# Patient Record
Sex: FEMALE | Race: WHITE | NOT HISPANIC OR LATINO | URBAN - NONMETROPOLITAN AREA
[De-identification: names, ages, dates, MRNs, and addresses within clinical notes are randomized per-mention and may not be internally consistent; named-entity substitution may affect disease eponyms.]

---

## 2022-04-26 DIAGNOSIS — N93.9 ABNORMAL UTERINE AND VAGINAL BLEEDING, UNSPECIFIED: ICD-10-CM

## 2022-04-26 DIAGNOSIS — Z01.818 ENCOUNTER FOR OTHER PREPROCEDURAL EXAMINATION: ICD-10-CM

## 2022-04-26 DIAGNOSIS — N84.0 POLYP OF CORPUS UTERI: ICD-10-CM

## 2023-06-28 ENCOUNTER — HOSPITAL ENCOUNTER (OUTPATIENT)
Dept: ULTRASOUND IMAGING | Facility: HOSPITAL | Age: 48
Discharge: HOME | End: 2023-06-28
Attending: OBSTETRICS & GYNECOLOGY
Payer: COMMERCIAL

## 2023-06-28 DIAGNOSIS — N83.201 UNSPECIFIED OVARIAN CYST, RIGHT SIDE: ICD-10-CM

## 2023-06-28 PROCEDURE — 76830 TRANSVAGINAL US NON-OB: CPT

## 2023-06-28 PROCEDURE — 76856 US EXAM PELVIC COMPLETE: CPT

## 2023-07-20 ENCOUNTER — ANESTHESIA EVENT (OUTPATIENT)
Dept: PERIOP | Facility: HOSPITAL | Age: 48
End: 2023-07-20
Payer: COMMERCIAL

## 2023-07-21 RX ORDER — METOPROLOL SUCCINATE 25 MG/1
12.5 TABLET, EXTENDED RELEASE ORAL
COMMUNITY

## 2023-07-21 RX ORDER — PANTOPRAZOLE SODIUM 40 MG/1
40 TABLET, DELAYED RELEASE ORAL 2 TIMES DAILY
COMMUNITY

## 2023-07-21 RX ORDER — SUMATRIPTAN 50 MG/1
50 TABLET, FILM COATED ORAL ONCE AS NEEDED
COMMUNITY

## 2023-07-21 RX ORDER — FLUTICASONE PROPIONATE 50 MCG
2 SPRAY, SUSPENSION (ML) NASAL AS NEEDED
COMMUNITY

## 2023-07-21 RX ORDER — LANOLIN ALCOHOL/MO/W.PET/CERES
CREAM (GRAM) TOPICAL DAILY
COMMUNITY

## 2023-07-21 RX ORDER — FAMOTIDINE 20 MG/1
20 TABLET, FILM COATED ORAL 2 TIMES DAILY
COMMUNITY

## 2023-07-21 NOTE — PRE-PROCEDURE INSTRUCTIONS
Pre-Surgery Instructions:   Medication Instructions   • famotidine (PEPCID) 20 mg tablet Take day of surgery. • fluticasone (FLONASE) 50 mcg/act nasal spray Uses PRN- OK to take day of surgery   • metoprolol succinate (TOPROL-XL) 25 mg 24 hr tablet Take night before surgery   • Multiple Vitamin (MULTIVITAMIN) capsule Stop taking 7 days prior to surgery. • norgestimate-ethinyl estradiol (ORTHO-CYCLEN) 0.25-35 MG-MCG per tablet Take day of surgery. • pantoprazole (PROTONIX) 40 mg tablet Take day of surgery. • SUMAtriptan (IMITREX) 50 mg tablet Uses PRN- OK to take day of surgery   • vitamin B-12 (VITAMIN B-12) 1,000 mcg tablet Stop taking 7 days prior to surgery. See above    . Medication instructions for day surgery reviewed. Please use only a sip of water to take your instructed medications. Avoid all over the counter vitamins, supplements and NSAIDS for one week prior to surgery per anesthesia guidelines. Tylenol is ok to take as needed. You will receive a call one business day prior to surgery with an arrival time and hospital directions. If your surgery is scheduled on a Monday, the hospital will be calling you on the Friday prior to your surgery. If you have not heard from anyone by 8pm, please call the hospital supervisor through the hospital  at [phone]. Romina Lakeshia [phone]). Do not eat or drink anything after midnight the night before your surgery, including candy, mints, lifesavers, or chewing gum. Do not drink alcohol 24hrs before your surgery. Try not to smoke at least 24hrs before your surgery. Follow the pre surgery showering instructions as listed in the [facility] Surgical Experience Booklet” or otherwise provided by your surgeon's office. Do not shave the surgical area 24 hours before surgery. Do not apply any lotions, creams, including makeup, cologne, deodorant, or perfumes after showering on the day of your surgery.      No contact lenses, eye make-up, or artificial eyelashes. Remove nail polish, including gel polish, and any artificial, gel, or acrylic nails if possible. Remove all jewelry including rings and body piercing jewelry. Wear causal clothing that is easy to take on and off. Consider your type of surgery. Keep any valuables, jewelry, piercings at home. Please bring any specially ordered equipment (sling, braces) if indicated. Arrange for a responsible person to drive you to and from the hospital on the day of your surgery. Visitor Guidelines discussed. Call the surgeon's office with any new illnesses, exposures, or additional questions prior to surgery. Please reference your [facility] Surgical Experience Booklet” for additional information to prepare for your upcoming surgery.

## 2023-08-02 ENCOUNTER — ANESTHESIA (OUTPATIENT)
Dept: PERIOP | Facility: HOSPITAL | Age: 48
End: 2023-08-02
Payer: COMMERCIAL

## 2023-09-05 NOTE — ANESTHESIA PREPROCEDURE EVALUATION
Procedure:  DIAGNOSTIC LAPAROSCOPY, ROBOTIC ASSISTED LAPAROSCOPIC BILATERAL SALPINGO-OOPHERECTOMY, ANY OTHER INDICATED PROCEDURE (Abdomen)    Relevant Problems   No relevant active problems      PONV    SVT    ANX/DEP  Physical Exam    Airway    Mallampati score: II  TM Distance: >3 FB  Neck ROM: full     Dental   No notable dental hx     Cardiovascular  Cardiovascular exam normal    Pulmonary  Pulmonary exam normal     Other Findings        Anesthesia Plan  ASA Score- 2     Anesthesia Type- general with ASA Monitors. Additional Monitors:   Airway Plan: ETT. Plan Factors-Exercise tolerance (METS): >4 METS. Chart reviewed. EKG reviewed. Imaging results reviewed. Existing labs reviewed. Patient summary reviewed. Patient is not a current smoker. Patient not instructed to abstain from smoking on day of procedure. Patient did not smoke on day of surgery. Obstructive sleep apnea risk education given perioperatively. Induction- intravenous. Postoperative Plan-     Informed Consent- Anesthetic plan and risks discussed with patient. I personally reviewed this patient with the CRNA. Discussed and agreed on the Anesthesia Plan with the CRNA. Kip Finch

## 2023-09-05 NOTE — PRE-PROCEDURE INSTRUCTIONS
Pre-Surgery Instructions:   Medication Instructions   • famotidine (PEPCID) 20 mg tablet Take day of surgery. • fluticasone (FLONASE) 50 mcg/act nasal spray Uses PRN- OK to take day of surgery   • metoprolol succinate (TOPROL-XL) 25 mg 24 hr tablet Take night before surgery   • Multiple Vitamin (MULTIVITAMIN) capsule Stop taking 7 days prior to surgery. • norgestimate-ethinyl estradiol (ORTHO-CYCLEN) 0.25-35 MG-MCG per tablet Take day of surgery. • pantoprazole (PROTONIX) 40 mg tablet Take day of surgery. • SUMAtriptan (IMITREX) 50 mg tablet Uses PRN- OK to take day of surgery   • vitamin B-12 (VITAMIN B-12) 1,000 mcg tablet Stop taking 7 days prior to surgery. See above    . Medication instructions for day surgery reviewed. Please use only a sip of water to take your instructed medications. Avoid all over the counter vitamins, supplements and NSAIDS for one week prior to surgery per anesthesia guidelines. Tylenol is ok to take as needed. You will receive a call one business day prior to surgery with an arrival time and hospital directions. If your surgery is scheduled on a Monday, the hospital will be calling you on the Friday prior to your surgery. If you have not heard from anyone by 8pm, please call the hospital supervisor through the hospital  at [phone]. Naeem Cool [phone]). Do not eat or drink anything after midnight the night before your surgery, including candy, mints, lifesavers, or chewing gum. Do not drink alcohol 24hrs before your surgery. Try not to smoke at least 24hrs before your surgery. Follow the pre surgery showering instructions as listed in the [facility] Surgical Experience Booklet” or otherwise provided by your surgeon's office. Do not shave the surgical area 24 hours before surgery. Do not apply any lotions, creams, including makeup, cologne, deodorant, or perfumes after showering on the day of your surgery.      No contact lenses, eye make-up, or artificial eyelashes. Remove nail polish, including gel polish, and any artificial, gel, or acrylic nails if possible. Remove all jewelry including rings and body piercing jewelry. Wear causal clothing that is easy to take on and off. Consider your type of surgery. Keep any valuables, jewelry, piercings at home. Please bring any specially ordered equipment (sling, braces) if indicated. Arrange for a responsible person to drive you to and from the hospital on the day of your surgery. Visitor Guidelines discussed. Call the surgeon's office with any new illnesses, exposures, or additional questions prior to surgery. Please reference your [facility] Booklet” for additional information to prepare for your upcoming surgery.

## 2023-09-05 NOTE — H&P
HISTORY AND PHYSICAL    GYN ANNUAL EXAM    Kellie Qureshi is a 56year old female. Chief Complaint   Patient presents with   • Gyn Return     HPI:    HPI    This is a 56year old  presents to the office today for a routine annual exam. Overall patient is doing well with no major issues. Denies fevers, chills, chest pain, shortness of breath, nausea and vomiting. No abdominal or pelvic pain. No abnormal bleeding or vaginal discharge. Patient would also like to talk about management for her persistent ovarian cysts. Despite birth control does get pain on a monthly basis. Would like to consider hysterectomy. OB History: para 0    Gyn History: q 28 days times 3 days. Flow light, Aches (only if on birth control pill) headaches. Pap Smear: 2018    Contraception: OCP    Family history: Multiple relatives with breast cancer. Both grandmothers. One aunt.  Also 1 aunt with ovarian cancer    Mammogram: scheduled for 2023    Colonoscopy:     DEXA scan: never    PMH:    Past Medical History:   Diagnosis Date   • Abnormal uterine bleeding (AUB) 2022   • Carpal tunnel syndrome   • Cervical polyp 2022   • COVID-19 virus infection 2023   • Depression   • Elevated LFTs 10/19/2020   • Hyperlipidemia   • Lactose intolerance   • Migraine   • Paroxysmal atrial tachycardia (HCC) 3/19/2019   • Peroneal tenosynovitis   • PONV (postoperative nausea and vomiting)     Past Surgical History:   Procedure Laterality Date   • ARTHROCENT ASP &/OR INJ MAJOR JX/BURSA W/O US 4/15/2014   ARTHROCENTESIS OR INJECTION MAJOR JOINT performed by Lu Emanuel MD at [address] [place] [address] 4/15/2014   NEUROPLASTY MEDIAN NERVE AT CARPAL TUNNEL performed by Lu Emanuel MD at [facility]   • COLONOSCOPY, DIAGNOSTIC (RECTUM) N/A 2022   COLONOSCOPY FLEXIBLE PROXIMAL DIAGNOSTIC performed by Byron Estes DO at [address] [place] [address]   • EGD, FLEXIBLE, DIAGNOSTIC N/A 2018 ESOPHAGOGASTRODUODENOSCOPY (EGD), FLEXIBLE, TRANSORAL, DIAGNOSTIC performed by Ronald Helm DO at [address] [place] [address]   • EGD, FLEXIBLE, DIAGNOSTIC N/A 9/20/2021   ESOPHAGOGASTRODUODENOSCOPY (EGD), FLEXIBLE, TRANSORAL, DIAGNOSTIC performed by Fransisco Estevez MD at [address] [place] [address]   • HYSTEROSCOPY W/BIOPSY AND/OR POLYPECTOMY W/WO D&C 8/2/2022   HYSTEROSCOPY WITH BIOPSY AND/OR POLYPECTOMY WITH OR WITHOUT D&C performed by Marquise Raya MD at [address]   • KNEE ARTHROSCOPY/REPAIR LIGAMENT Left 9/28/2020   ARTHROSCOPICALLY AIDED ACL RECONSTRUCTION performed by Jerri Villalpando MD at [facility]   • KNEE ARTHROSCOPY/SYNOVECTOMY, MAJOR 9/28/2020   ARTHROSCOPY KNEE SYNOVECTOMY 2 OR MORE COMPARTMENTS performed by Jerri Villalpando MD at [address] MEDIAL/MEDIAL RECONSTRUCTION Left 9/28/2020   KNEE POSTERA MEDIAL/MEDIAL RECONSTRUCTION performed by Jerri Villalpando MD at [address] [facility]; CHOLECYSTECTOMY N/A 1/2/2019   LAPAROSCOPIC CHOLECYSTECTOMY performed by Pola Mccloud MD at [address] [place] [address] Blvd     Family History   Problem Relation Age of Onset   • Diabetes Mother   • Other (High cholesterol) Mother   • Hypertension Father   • Heart attack Father 58   S/p CABG x4   • Cancer Grandmother (Maternal)   Breast   • Breast Cancer Grandmother (Maternal)   Mid 46s   • Diabetes Grandmother (Paternal)   • Breast Cancer Grandmother (Paternal)   • Heart attack Grandfather (Paternal)   • Colon cancer Grandfather (Paternal) 68   • Hypertension Uncle (Unspecified)   • Diabetes Aunt (Maternal)   • Ovarian cancer Aunt (Maternal)   • Breast Cancer Aunt (Maternal)   • Diabetes Aunt (Paternal)   3 paternal aunts   • Heart attack Uncle (Paternal)   3 paternal uncles   • Cancer Other   Breast   • No Known Problems Other   • Breast Cancer Cousin (Maternal)   • Breast Cancer Cousin (Paternal)     Current Outpatient Medications   Medication Sig Dispense Refill   • Multiple Vitamin (MULTIVITAMINS) Capsule Take 1 Capsule by mouth in the morning. • Riboflavin 400 MG Tablet Take 1 Tab by mouth daily. 90 Tab 1   • Pantoprazole Sodium 40 MG Oral Tablet Delayed Release (Protonix) take 1 tablet by mouth twice a day 180 Tablet 2   • Famotidine 20 MG Oral Tablet (Pepcid) take 1 tablet by mouth twice a day 180 Tablet 0   • Fluticasone Propionate 50 MCG/ACT Nasal Suspension (Flonase) Administer 2 Sprays into each nostril in the morning. 9.9 mL 1   • Metoprolol Succinate ER 25 MG Oral Tablet Extended Release 24 Hour (toPROL XL) Take 0.5 Tablets by mouth in the morning. 45 Tablet 3   • SUMAtriptan Succinate 50 MG Oral Tablet (Imitrex) Take 1 tablets at onset of migraine and one tablet every 2 hours as needed, not more than 4 tablets in 24 hours 8 Tablet 11   • Levonorgestrel-Ethinyl Estrad 0.15-30 MG-MCG Oral Tablet (Isela-28) Take 1 Tablet by mouth in the morning. 84 Tablet 4     No current facility-administered medications for this visit. Review of patient's allergies indicates: Allergen Reactions   • Strawberry Extract Edema airway   Eyes swell, chest rash, throat closing   • Effexor [Venlafaxine] Other (Please comment)   Shashun lugo   • Ketek [Telithromycin]   Chest pain   • Lactose   • Raspberry Allergy test positive   • Rhubarb Itching and Rash   • Topamax [Topiramate] Other (Please comment)   swelling     ROS:  Review of Systems   Constitutional: Negative for activity change, appetite change, fatigue and fever. HENT: Negative for congestion, facial swelling, hearing loss, sinus pressure and sinus pain. Eyes: Negative for discharge and itching. Respiratory: Negative for apnea and chest tightness. Cardiovascular: Negative for chest pain and leg swelling. Gastrointestinal: Negative for abdominal distention, abdominal pain and nausea. Endocrine: Negative for cold intolerance and heat intolerance. Genitourinary: Negative for difficulty urinating, dyspareunia, vaginal bleeding, vaginal discharge and vaginal pain.    Musculoskeletal: Negative for arthralgias, back pain and gait problem. Skin: Negative for color change and pallor. Allergic/Immunologic: Negative for environmental allergies. Neurological: Negative for dizziness, facial asymmetry and numbness. Psychiatric/Behavioral: Negative for agitation, behavioral problems and suicidal ideas. Objective   /66  Pulse 81  Temp 36.5 °C (97.7 °F)  Resp 20  Ht 1.651 m (5' 5")  Wt 114.3 kg (252 lb)  LMP 05/28/2023 (Exact Date)  SpO2 97%  BMI 41.93 kg/m²  BSA 2.29 m²     Physical Exam:  Physical Exam  Constitutional:   General: She is not in acute distress. Appearance: She is not ill-appearing or toxic-appearing. HENT:   Head: Normocephalic and atraumatic. Nose: Nose normal.   Mouth/Throat:   Mouth: Mucous membranes are moist.     Eyes:   Conjunctiva/sclera: Conjunctivae normal.   Pupils: Pupils are equal, round, and reactive to light. Cardiovascular:   Rate and Rhythm: Normal rate and regular rhythm. Breast Exam  Right breast: No lumps, lesions or masses palpated. No swollen lymph nodes. No nipple discharge. Left breast: No lumps, lesions or masses palpated. No swollen lymph nodes. No nipple discharge. Breasts are symmetrical in shape    Pulmonary:   Effort: No respiratory distress. Breath sounds: Normal breath sounds. Abdominal:   Palpations: Abdomen is soft. Tenderness: There is no abdominal tenderness. There is no guarding. Genitourinary:  General: Normal vulva. Vagina: No vaginal discharge. External exam: No rashes, lumps or lesions noted. Internal exam: No cervical motion tenderness. Anteverted uterus. Negative for adnexal tenderness. No adnexal masses palpated. No vaginal bleeding noted. No abnormal discharge noted    Musculoskeletal:   General: No swelling or tenderness. Neurological:   Mental Status: She is alert and oriented to person, place, and time.      Psychiatric:   Mood and Affect: Mood normal.   Behavior: Behavior normal.         ASSESSMENT/PLAN:    Musa Shultz was seen today for gyn return. Diagnoses and all orders for this visit:    Encounter for gynecological examination without abnormal finding  - GYN PAP SCREEN    Screen for STD (sexually transmitted disease)    Screening breast examination    Birth control counseling    Family history of breast cancer    Right ovarian cyst  - US PELVIS TRANS-VAGINAL NON-OB    Cyst of right ovary  -   - CEA    Pre-op testing  - CBC WITH WBC DIFFERENTIAL    Other orders  - Levonorgestrel-Ethinyl Estrad 0.15-30 MG-MCG Oral Tablet (Isela-28); Take 1 Tablet by mouth in the morning. 7. Persistent ovarian cysts  Does have history of right dermoid cyst. Mostly the pain is on her right side. There is a significant family history of breast and ovarian cancer. Cycles have been relatively normal  -discussed different surgical options. Patient does have several comorbidities. Unfortunately, birth control is not helping her pain. We discussed at every surgery has risks. We discussed more conservative versus aggressive options. We discussed things like unilateral oophorectomy, versus bilateral salpingo-oophorectomy versus hysterectomy. Risks and benefits were discussed. Will opt for a robotic bilateral salpingo-oophorectomy. All questions were answered. Consents were signed.     Patient will be for surgery at [address] [place] [address]  will need medical clearance from PCP

## 2023-09-06 ENCOUNTER — HOSPITAL ENCOUNTER (OUTPATIENT)
Facility: HOSPITAL | Age: 48
Setting detail: OUTPATIENT SURGERY
Discharge: HOME | End: 2023-09-06
Attending: OBSTETRICS & GYNECOLOGY | Admitting: OBSTETRICS & GYNECOLOGY
Payer: COMMERCIAL

## 2023-09-06 VITALS
OXYGEN SATURATION: 97 % | WEIGHT: 253 LBS | TEMPERATURE: 97.9 F | BODY MASS INDEX: 42.15 KG/M2 | HEART RATE: 75 BPM | RESPIRATION RATE: 20 BRPM | DIASTOLIC BLOOD PRESSURE: 76 MMHG | HEIGHT: 65 IN | SYSTOLIC BLOOD PRESSURE: 114 MMHG

## 2023-09-06 DIAGNOSIS — R10.2 PELVIC AND PERINEAL PAIN: ICD-10-CM

## 2023-09-06 DIAGNOSIS — N83.201 UNSPECIFIED OVARIAN CYST, RIGHT SIDE: ICD-10-CM

## 2023-09-06 LAB
ABO GROUP BLD: NORMAL
BLD GP AB SCN SERPL QL: NEGATIVE
EXT PREGNANCY TEST URINE: NEGATIVE
EXT. CONTROL: NORMAL
RH BLD: POSITIVE
SPECIMEN EXPIRATION DATE: NORMAL

## 2023-09-06 PROCEDURE — 86901 BLOOD TYPING SEROLOGIC RH(D): CPT | Performed by: ANESTHESIOLOGY

## 2023-09-06 PROCEDURE — 86850 RBC ANTIBODY SCREEN: CPT | Performed by: ANESTHESIOLOGY

## 2023-09-06 PROCEDURE — 81025 URINE PREGNANCY TEST: CPT | Performed by: OBSTETRICS & GYNECOLOGY

## 2023-09-06 PROCEDURE — 88305 TISSUE EXAM BY PATHOLOGIST: CPT | Performed by: PATHOLOGY

## 2023-09-06 PROCEDURE — 86900 BLOOD TYPING SEROLOGIC ABO: CPT | Performed by: ANESTHESIOLOGY

## 2023-09-06 RX ORDER — ONDANSETRON 2 MG/ML
INJECTION INTRAMUSCULAR; INTRAVENOUS AS NEEDED
Status: DISCONTINUED | OUTPATIENT
Start: 2023-09-06 | End: 2023-09-06

## 2023-09-06 RX ORDER — FENTANYL CITRATE/PF 50 MCG/ML
25 SYRINGE (ML) INJECTION
Status: DISCONTINUED | OUTPATIENT
Start: 2023-09-06 | End: 2023-09-06 | Stop reason: HOSPADM

## 2023-09-06 RX ORDER — SODIUM CHLORIDE, SODIUM LACTATE, POTASSIUM CHLORIDE, CALCIUM CHLORIDE 600; 310; 30; 20 MG/100ML; MG/100ML; MG/100ML; MG/100ML
125 INJECTION, SOLUTION INTRAVENOUS CONTINUOUS
Status: DISCONTINUED | OUTPATIENT
Start: 2023-09-06 | End: 2023-09-06 | Stop reason: HOSPADM

## 2023-09-06 RX ORDER — HYDROMORPHONE HCL/PF 1 MG/ML
SYRINGE (ML) INJECTION AS NEEDED
Status: DISCONTINUED | OUTPATIENT
Start: 2023-09-06 | End: 2023-09-06

## 2023-09-06 RX ORDER — ONDANSETRON 2 MG/ML
4 INJECTION INTRAMUSCULAR; INTRAVENOUS ONCE AS NEEDED
Status: DISCONTINUED | OUTPATIENT
Start: 2023-09-06 | End: 2023-09-06 | Stop reason: HOSPADM

## 2023-09-06 RX ORDER — SCOLOPAMINE TRANSDERMAL SYSTEM 1 MG/1
1 PATCH, EXTENDED RELEASE TRANSDERMAL
Status: DISCONTINUED | OUTPATIENT
Start: 2023-09-06 | End: 2023-09-06 | Stop reason: HOSPADM

## 2023-09-06 RX ORDER — HYDROMORPHONE HCL/PF 1 MG/ML
0.5 SYRINGE (ML) INJECTION
Status: DISCONTINUED | OUTPATIENT
Start: 2023-09-06 | End: 2023-09-06 | Stop reason: HOSPADM

## 2023-09-06 RX ORDER — ONDANSETRON 2 MG/ML
4 INJECTION INTRAMUSCULAR; INTRAVENOUS EVERY 6 HOURS PRN
Status: DISCONTINUED | OUTPATIENT
Start: 2023-09-06 | End: 2023-09-06 | Stop reason: HOSPADM

## 2023-09-06 RX ORDER — FENTANYL CITRATE 50 UG/ML
INJECTION, SOLUTION INTRAMUSCULAR; INTRAVENOUS AS NEEDED
Status: DISCONTINUED | OUTPATIENT
Start: 2023-09-06 | End: 2023-09-06

## 2023-09-06 RX ORDER — KETOROLAC TROMETHAMINE 30 MG/ML
INJECTION, SOLUTION INTRAMUSCULAR; INTRAVENOUS AS NEEDED
Status: DISCONTINUED | OUTPATIENT
Start: 2023-09-06 | End: 2023-09-06

## 2023-09-06 RX ORDER — PROPOFOL 10 MG/ML
INJECTION, EMULSION INTRAVENOUS AS NEEDED
Status: DISCONTINUED | OUTPATIENT
Start: 2023-09-06 | End: 2023-09-06

## 2023-09-06 RX ORDER — LIDOCAINE HYDROCHLORIDE 10 MG/ML
INJECTION, SOLUTION EPIDURAL; INFILTRATION; INTRACAUDAL; PERINEURAL AS NEEDED
Status: DISCONTINUED | OUTPATIENT
Start: 2023-09-06 | End: 2023-09-06

## 2023-09-06 RX ORDER — SODIUM CHLORIDE 9 MG/ML
125 INJECTION, SOLUTION INTRAVENOUS CONTINUOUS
Status: DISCONTINUED | OUTPATIENT
Start: 2023-09-06 | End: 2023-09-06

## 2023-09-06 RX ORDER — GLYCOPYRROLATE 0.2 MG/ML
INJECTION INTRAMUSCULAR; INTRAVENOUS AS NEEDED
Status: DISCONTINUED | OUTPATIENT
Start: 2023-09-06 | End: 2023-09-06

## 2023-09-06 RX ORDER — SODIUM CHLORIDE, SODIUM LACTATE, POTASSIUM CHLORIDE, CALCIUM CHLORIDE 600; 310; 30; 20 MG/100ML; MG/100ML; MG/100ML; MG/100ML
INJECTION, SOLUTION INTRAVENOUS CONTINUOUS PRN
Status: DISCONTINUED | OUTPATIENT
Start: 2023-09-06 | End: 2023-09-06

## 2023-09-06 RX ORDER — CEFAZOLIN SODIUM 2 G/50ML
2000 SOLUTION INTRAVENOUS ONCE
Status: COMPLETED | OUTPATIENT
Start: 2023-09-06 | End: 2023-09-06

## 2023-09-06 RX ORDER — IBUPROFEN 600 MG/1
600 TABLET ORAL EVERY 6 HOURS PRN
Status: DISCONTINUED | OUTPATIENT
Start: 2023-09-06 | End: 2023-09-06 | Stop reason: HOSPADM

## 2023-09-06 RX ORDER — ALBUTEROL SULFATE 2.5 MG/3ML
2.5 SOLUTION RESPIRATORY (INHALATION) ONCE AS NEEDED
Status: DISCONTINUED | OUTPATIENT
Start: 2023-09-06 | End: 2023-09-06 | Stop reason: HOSPADM

## 2023-09-06 RX ORDER — DEXAMETHASONE SODIUM PHOSPHATE 10 MG/ML
INJECTION, SOLUTION INTRAMUSCULAR; INTRAVENOUS AS NEEDED
Status: DISCONTINUED | OUTPATIENT
Start: 2023-09-06 | End: 2023-09-06

## 2023-09-06 RX ORDER — ESTROGEN,CON/M-PROGEST ACET 0.3-1.5MG
1 TABLET ORAL DAILY
Qty: 90 TABLET | Refills: 4 | Status: SHIPPED | OUTPATIENT
Start: 2023-09-06

## 2023-09-06 RX ORDER — MIDAZOLAM HYDROCHLORIDE 2 MG/2ML
INJECTION, SOLUTION INTRAMUSCULAR; INTRAVENOUS AS NEEDED
Status: DISCONTINUED | OUTPATIENT
Start: 2023-09-06 | End: 2023-09-06

## 2023-09-06 RX ORDER — MAGNESIUM HYDROXIDE 1200 MG/15ML
LIQUID ORAL AS NEEDED
Status: DISCONTINUED | OUTPATIENT
Start: 2023-09-06 | End: 2023-09-06 | Stop reason: HOSPADM

## 2023-09-06 RX ORDER — BUPIVACAINE HYDROCHLORIDE 2.5 MG/ML
INJECTION, SOLUTION EPIDURAL; INFILTRATION; INTRACAUDAL AS NEEDED
Status: DISCONTINUED | OUTPATIENT
Start: 2023-09-06 | End: 2023-09-06 | Stop reason: HOSPADM

## 2023-09-06 RX ORDER — ACETAMINOPHEN 325 MG/1
975 TABLET ORAL ONCE
Status: COMPLETED | OUTPATIENT
Start: 2023-09-06 | End: 2023-09-06

## 2023-09-06 RX ORDER — METOCLOPRAMIDE HYDROCHLORIDE 5 MG/ML
INJECTION INTRAMUSCULAR; INTRAVENOUS AS NEEDED
Status: DISCONTINUED | OUTPATIENT
Start: 2023-09-06 | End: 2023-09-06

## 2023-09-06 RX ORDER — ROCURONIUM BROMIDE 10 MG/ML
INJECTION, SOLUTION INTRAVENOUS AS NEEDED
Status: DISCONTINUED | OUTPATIENT
Start: 2023-09-06 | End: 2023-09-06

## 2023-09-06 RX ORDER — SODIUM CHLORIDE 9 MG/ML
125 INJECTION, SOLUTION INTRAVENOUS CONTINUOUS
Status: DISCONTINUED | OUTPATIENT
Start: 2023-09-06 | End: 2023-09-06 | Stop reason: HOSPADM

## 2023-09-06 RX ADMIN — ACETAMINOPHEN 975 MG: 325 TABLET, FILM COATED ORAL at 08:27

## 2023-09-06 RX ADMIN — ONDANSETRON 4 MG: 2 INJECTION INTRAMUSCULAR; INTRAVENOUS at 10:03

## 2023-09-06 RX ADMIN — ROCURONIUM BROMIDE 50 MG: 10 INJECTION, SOLUTION INTRAVENOUS at 08:48

## 2023-09-06 RX ADMIN — SUGAMMADEX 200 MG: 100 INJECTION, SOLUTION INTRAVENOUS at 10:20

## 2023-09-06 RX ADMIN — GLYCOPYRROLATE 0.2 MG: 0.2 INJECTION, SOLUTION INTRAMUSCULAR; INTRAVENOUS at 09:37

## 2023-09-06 RX ADMIN — DEXMEDETOMIDINE HYDROCHLORIDE 8 MCG: 100 INJECTION, SOLUTION INTRAVENOUS at 09:24

## 2023-09-06 RX ADMIN — LIDOCAINE HYDROCHLORIDE 50 MG: 10 INJECTION, SOLUTION EPIDURAL; INFILTRATION; INTRACAUDAL at 08:48

## 2023-09-06 RX ADMIN — DEXAMETHASONE SODIUM PHOSPHATE 8 MG: 10 INJECTION, SOLUTION INTRAMUSCULAR; INTRAVENOUS at 08:55

## 2023-09-06 RX ADMIN — CEFAZOLIN SODIUM 2000 MG: 2 SOLUTION INTRAVENOUS at 08:44

## 2023-09-06 RX ADMIN — FENTANYL CITRATE 50 MCG: 50 INJECTION INTRAMUSCULAR; INTRAVENOUS at 09:25

## 2023-09-06 RX ADMIN — DEXMEDETOMIDINE HYDROCHLORIDE 8 MCG: 100 INJECTION, SOLUTION INTRAVENOUS at 09:19

## 2023-09-06 RX ADMIN — HYDROMORPHONE HYDROCHLORIDE 0.5 MG: 1 INJECTION, SOLUTION INTRAMUSCULAR; INTRAVENOUS; SUBCUTANEOUS at 10:11

## 2023-09-06 RX ADMIN — METOCLOPRAMIDE 10 MG: 5 INJECTION, SOLUTION INTRAMUSCULAR; INTRAVENOUS at 10:03

## 2023-09-06 RX ADMIN — SODIUM CHLORIDE, SODIUM LACTATE, POTASSIUM CHLORIDE, AND CALCIUM CHLORIDE: .6; .31; .03; .02 INJECTION, SOLUTION INTRAVENOUS at 08:44

## 2023-09-06 RX ADMIN — SCOPALAMINE 1 PATCH: 1 PATCH, EXTENDED RELEASE TRANSDERMAL at 08:27

## 2023-09-06 RX ADMIN — DEXMEDETOMIDINE HYDROCHLORIDE 12 MCG: 100 INJECTION, SOLUTION INTRAVENOUS at 09:17

## 2023-09-06 RX ADMIN — KETOROLAC TROMETHAMINE 30 MG: 30 INJECTION, SOLUTION INTRAMUSCULAR at 10:03

## 2023-09-06 RX ADMIN — PROPOFOL 200 MG: 10 INJECTION, EMULSION INTRAVENOUS at 08:48

## 2023-09-06 RX ADMIN — FENTANYL CITRATE 50 MCG: 50 INJECTION INTRAMUSCULAR; INTRAVENOUS at 08:48

## 2023-09-06 RX ADMIN — SODIUM CHLORIDE, SODIUM LACTATE, POTASSIUM CHLORIDE, CALCIUM CHLORIDE: 600; 310; 30; 20 INJECTION, SOLUTION INTRAVENOUS at 08:52

## 2023-09-06 RX ADMIN — MIDAZOLAM 2 MG: 1 INJECTION INTRAMUSCULAR; INTRAVENOUS at 08:44

## 2023-09-06 RX ADMIN — DEXMEDETOMIDINE HYDROCHLORIDE 12 MCG: 100 INJECTION, SOLUTION INTRAVENOUS at 09:32

## 2023-09-06 NOTE — DISCHARGE SUMMARY
Discharge Summary - Dae Linares 56 y.o. female MRN: [id number]    Unit/Bed#: [facility]EE [facility] Encounter: [id number]    Admission Date:     Admitting Diagnosis: Unspecified ovarian cyst, right side [N83.201]  Pelvic and perineal pain [R10.2]      Procedures Performed: Diagnostic laparoscopy, robotic assisted bilateral salpingo-oophorectomy, ablation of endometriosis implants    Summary of Hospital Course: Patient was here for scheduled surgery. No issues and discharged home the same day    Significant Findings, Care, Treatment and Services Provided: Normal-appearing left tube and ovary. Right adhered, ovary and tube. Mild endometriosis implants noted in the pelvis    Complications: none    Discharge Diagnosis: Chronic pelvic pain, ovarian cysts    Medical Problems        Condition at Discharge: good         Discharge instructions/Information to patient and family:   See after visit summary for information provided to patient and family. Provisions for Follow-Up Care:  See after visit summary for information related to follow-up care and any pertinent home health orders. PCP: No primary care provider on file. Disposition: Home    Planned Readmission: No      Discharge Statement   I spent 15 minutes discharging the patient. This time was spent on the day of discharge. I had direct contact with the patient on the day of discharge. Additional documentation is required if more than 30 minutes were spent on discharge. Discharge Medications:  See after visit summary for reconciled discharge medications provided to patient and family.

## 2023-09-06 NOTE — DISCHARGE INSTR - AVS FIRST PAGE
Please take Tylenol and Advil and GAS X  as needed  No lifting, pushing or pulling more than 30 lb for 10 days  No sex, tampons or douching for 3 weeks  Please make an appointment to see me in the office in 3 weeks  Please call with any abnormal discharge like pus. Fevers, etc.  Remove bandaids in 24 hours, may shower tomorrow. Diet as tolerated  Please remove glue if it does not come off on its own after 10 days  May apply cocoa butter, vitamin D lotion to the incisions after 14 days.   To help decrease the scar

## 2023-09-06 NOTE — OP NOTE
OPERATIVE REPORT  PATIENT NAME: Kori Red    :  1975  MRN: [id number]  Pt Location: OW OR ROOM 01    SURGERY DATE: 2023    Surgeon(s) and Role:     * Anusha Hickman, DO - Primary     * Genaro Varela PA-C - Assisting    Preop Diagnosis:  Unspecified ovarian cyst, right side [N83.201]  Pelvic and perineal pain [R10.2]    Post-Op Diagnosis Codes:     * Unspecified ovarian cyst, right side [N83.201]     * Pelvic and perineal pain [R10.2]    Procedure(s):  DIAGNOSTIC LAPAROSCOPY. ROBOTIC ASSISTED LAPAROSCOPIC BILATERAL SALPINGO-OOPHERECTOMY. ABLATION OF ENDOMETRIOSIS IMPLANTS    Specimen(s):  ID Type Source Tests Collected by Time Destination   1 : left tube and ovary Tissue Ovary, Left TISSUE EXAM Anusha Hickman, DO 2023  9:46 AM    2 : right tube and ovary Tissue Ovary, Right TISSUE EXAM Anusha Hickman, DO 2023  9:46 AM        Estimated Blood Loss:   Minimal    Drains:  No LDAs found     Anesthesia Type:   General    Operative Indications:  Unspecified ovarian cyst, right side [N83.201]  Pelvic and perineal pain [R10.2]      Operative Findings:  Appearing left ovary and tube. Congested and adhesed right fallopian tube and ovary    Mild to moderate endometriosis implants in the pelvis    Complications:   None    Procedure and Technique:  General anesthesia was induced and the patient was placed in the dorsal lithotomy position. The abdomen, perineum, and vagina were prepped and draped in the usual fashion. A garcia catheter was inserted into the bladder and attached to straight drainage. After the initial preparation, the procedure commenced at the vagina. With a weighted speculum in place to visualize the cervix, Uertine manipulator was placed     Following infiltration with Lidocaine, an infraumbilical incision was made and direct entry was made with 5 mm trocar. With CO2 infiltration, a pneumoperitoneum of 15 mmHg was created.  A 5 mm trocar was then passed through the same incision and the laparoscope was then inserted through the trocar sleeve. Visualization of the peritoneal cavity was then obtained and a brief inspection did not reveal any signs of complications from entry. Under direct observation, 2 more 8mm robotic ports were placed approximately 8 cm apart. The 5 mm camera in the umbilicus was replaced with a 8mm port to accommodate the robotic camera. Once the placement of the 3 ports was complete, the robot was docked, and the actual laparoscopic procedure began. Beginning on the left side and distally along the length of the fallopean tube, the fallopian tube was cut along along the broad ligament with vessel sealer. IT was removed thru the 8 mm port without issue. The process was repeated with right fallopian tube, it was also removed thru the 8 mm port without issue    Next, attention was paid to both of the ovaries. The left ovary was grasped with a grasper. It was coagulated and cut along the IP ligament. Hemostasis was achieved. It was placed in an Endo Catch bag and removed    Process was repeated with the right ovary. It was placed in a separate Endo Catch bag and removed    Finally, decision was made to burn, and ablate the endometriosis implants. The laparoscopic scissors were inserted. Approximately 5-6 endometriosis implants were ablated. Using the laparoscopic irrigation device, the abdomen was carefully irrigated and inspected to ensure complete hemostasis. Once the entire abdomen was inspected , the water was suctioned and the instruments carefully removed. The ports were then removed under direct visualization being sure to note hemostasis of the port sites on removal. The incisions were then closed with monocryl sutures. Both tubes and ovaries were sent to pathology     The patient tolerated the procedure well.  The patient was awakened from anesthesia and taken to the recovery room in stable condition. All sponges, instruments, and sharps were counted and correct.           Patient Disposition:  PACU     This procedure was not performed to treat colon cancer through resection      SIGNATURE: Norma West.,   DATE: September 6, 2023  TIME: 10:03 AM

## 2023-09-06 NOTE — ANESTHESIA POSTPROCEDURE EVALUATION
Post-Op Assessment Note    CV Status:  Stable    Pain management: adequate     Mental Status:  Alert and awake   Hydration Status:  Euvolemic   PONV Controlled:  Controlled   Airway Patency:  Patent      Post Op Vitals Reviewed: Yes      Staff: CRNA         No notable events documented.     /69 (09/06/23 1032)    Temp (!) 97.1 °F (36.2 °C) (09/06/23 1032)    Pulse 69 (09/06/23 1032)   Resp 14 (09/06/23 1032)    SpO2 100 % (09/06/23 1032)

## 2023-09-11 PROCEDURE — 88305 TISSUE EXAM BY PATHOLOGIST: CPT | Performed by: PATHOLOGY

## 2024-03-01 ENCOUNTER — RX ONLY (RX ONLY)
Age: 49
End: 2024-03-01

## 2024-03-01 ENCOUNTER — OPTICAL OFFICE (OUTPATIENT)
Dept: URBAN - NONMETROPOLITAN AREA CLINIC 4 | Facility: CLINIC | Age: 49
Setting detail: OPHTHALMOLOGY
End: 2024-03-01
Payer: COMMERCIAL

## 2024-03-01 ENCOUNTER — DOCTOR'S OFFICE (OUTPATIENT)
Dept: URBAN - NONMETROPOLITAN AREA CLINIC 1 | Facility: CLINIC | Age: 49
Setting detail: OPHTHALMOLOGY
End: 2024-03-01
Payer: COMMERCIAL

## 2024-03-01 DIAGNOSIS — H52.4: ICD-10-CM

## 2024-03-01 DIAGNOSIS — Z01.00: ICD-10-CM

## 2024-03-01 DIAGNOSIS — H52.03: ICD-10-CM

## 2024-03-01 PROCEDURE — V2200 LENS SPHER BIFOC PLANO 4.00D: HCPCS | Mod: RT

## 2024-03-01 PROCEDURE — V2744 TINT PHOTOCHROMATIC LENS/ES: HCPCS

## 2024-03-01 PROCEDURE — V2784 LENS POLYCARB OR EQUAL: HCPCS

## 2024-03-01 PROCEDURE — V2744 TINT PHOTOCHROMATIC LENS/ES: HCPCS | Mod: LT

## 2024-03-01 PROCEDURE — V2203 LENS SPHCYL BIFOCAL 4.00D/.1: HCPCS | Mod: LT

## 2024-03-01 PROCEDURE — V2020 VISION SVCS FRAMES PURCHASES: HCPCS

## 2024-03-01 PROCEDURE — V2784 LENS POLYCARB OR EQUAL: HCPCS | Mod: LT

## 2024-03-01 PROCEDURE — 92015 DETERMINE REFRACTIVE STATE: CPT

## 2024-03-01 PROCEDURE — V2025 EYEGLASSES DELUX FRAMES: HCPCS

## 2024-03-01 PROCEDURE — 92004 COMPRE OPH EXAM NEW PT 1/>: CPT

## 2024-03-08 ENCOUNTER — HOSPITAL ENCOUNTER (OUTPATIENT)
Dept: NUCLEAR MEDICINE | Facility: HOSPITAL | Age: 49
Discharge: HOME | End: 2024-03-08
Attending: HOSPITALIST
Payer: COMMERCIAL

## 2024-03-08 DIAGNOSIS — K21.00 GASTRO-ESOPHAGEAL REFLUX DISEASE WITH ESOPHAGITIS, WITHOUT BLEEDING: ICD-10-CM

## 2024-03-08 PROCEDURE — A9541 TC99M SULFUR COLLOID: HCPCS

## 2024-03-08 PROCEDURE — 78264 GASTRIC EMPTYING IMG STUDY: CPT

## 2024-05-16 ENCOUNTER — APPOINTMENT (OUTPATIENT)
Dept: LAB | Facility: HOSPITAL | Age: 49
End: 2024-05-16
Payer: COMMERCIAL

## 2024-05-16 DIAGNOSIS — R07.9 CHEST PAIN, UNSPECIFIED TYPE: ICD-10-CM

## 2024-05-16 DIAGNOSIS — R06.02 SOB (SHORTNESS OF BREATH): ICD-10-CM

## 2024-05-16 DIAGNOSIS — I89.0 LYMPHEDEMA: ICD-10-CM

## 2024-05-16 DIAGNOSIS — I47.19 PAROXYSMAL ATRIAL TACHYCARDIA: ICD-10-CM

## 2024-05-16 DIAGNOSIS — R06.83 SNORING: ICD-10-CM

## 2024-05-16 LAB — BNP SERPL-MCNC: 31 PG/ML (ref 0–100)

## 2024-05-16 PROCEDURE — 83880 ASSAY OF NATRIURETIC PEPTIDE: CPT

## 2024-05-16 PROCEDURE — 36415 COLL VENOUS BLD VENIPUNCTURE: CPT

## 2024-07-09 ENCOUNTER — HOSPITAL ENCOUNTER (OUTPATIENT)
Dept: NUCLEAR MEDICINE | Facility: HOSPITAL | Age: 49
Discharge: HOME | End: 2024-07-09
Payer: COMMERCIAL

## 2024-07-09 ENCOUNTER — HOSPITAL ENCOUNTER (OUTPATIENT)
Dept: NON INVASIVE DIAGNOSTICS | Facility: HOSPITAL | Age: 49
Discharge: HOME | End: 2024-07-09
Payer: COMMERCIAL

## 2024-07-09 VITALS
BODY MASS INDEX: 42.15 KG/M2 | HEART RATE: 80 BPM | HEIGHT: 65 IN | DIASTOLIC BLOOD PRESSURE: 76 MMHG | SYSTOLIC BLOOD PRESSURE: 114 MMHG | WEIGHT: 253 LBS

## 2024-07-09 DIAGNOSIS — R06.02 SHORTNESS OF BREATH: ICD-10-CM

## 2024-07-09 DIAGNOSIS — I47.19 OTHER SUPRAVENTRICULAR TACHYCARDIA: ICD-10-CM

## 2024-07-09 DIAGNOSIS — I89.0 LYMPHEDEMA, NOT ELSEWHERE CLASSIFIED: ICD-10-CM

## 2024-07-09 DIAGNOSIS — R07.9 CHEST PAIN, UNSPECIFIED: ICD-10-CM

## 2024-07-09 DIAGNOSIS — R06.83 SNORING: ICD-10-CM

## 2024-07-09 LAB
AORTIC ROOT: 2.5 CM
APICAL FOUR CHAMBER EJECTION FRACTION: 67 %
BSA FOR ECHO PROCEDURE: 2.19 M2
E WAVE DECELERATION TIME: 155 MS
E/A RATIO: 1.02
FRACTIONAL SHORTENING: 38 (ref 28–44)
INTERVENTRICULAR SEPTUM IN DIASTOLE (PARASTERNAL SHORT AXIS VIEW): 1.1 CM
INTERVENTRICULAR SEPTUM: 1.1 CM (ref 0.6–1.1)
LAAS-AP2: 13.1 CM2
LAAS-AP4: 13 CM2
LEFT ATRIUM SIZE: 4 CM
LEFT ATRIUM VOLUME (MOD BIPLANE): 32 ML
LEFT ATRIUM VOLUME INDEX (MOD BIPLANE): 14.6 ML/M2
LEFT INTERNAL DIMENSION IN SYSTOLE: 3 CM (ref 2.1–4)
LEFT VENTRICULAR INTERNAL DIMENSION IN DIASTOLE: 4.8 CM (ref 3.5–6)
LEFT VENTRICULAR POSTERIOR WALL IN END DIASTOLE: 1.1 CM
LEFT VENTRICULAR STROKE VOLUME: 70 ML
LVSV (TEICH): 70 ML
MAX HR: 100 BPM
MV E'TISSUE VEL-SEP: 11 CM/S
MV PEAK A VEL: 0.88 M/S
MV PEAK E VEL: 90 CM/S
MV STENOSIS PRESSURE HALF TIME: 45 MS
MV VALVE AREA P 1/2 METHOD: 4.89
NUC STRESS EJECTION FRACTION: 80 %
RA PRESSURE ESTIMATED: 3 MMHG
RATE PRESSURE PRODUCT: NORMAL
RIGHT ATRIUM AREA SYSTOLE A4C: 12.6 CM2
RIGHT VENTRICLE ID DIMENSION: 2.7 CM
RV PSP: 19 MMHG
SL CV LEFT ATRIUM LENGTH A2C: 4.1 CM
SL CV LV EF: 66
SL CV PED ECHO LEFT VENTRICLE DIASTOLIC VOLUME (MOD BIPLANE) 2D: 106 ML
SL CV PED ECHO LEFT VENTRICLE SYSTOLIC VOLUME (MOD BIPLANE) 2D: 36 ML
SL CV REST NUCLEAR ISOTOPE DOSE: 15.4 MCI
SL CV STRESS NUCLEAR ISOTOPE DOSE: 48.2 MCI
SL CV STRESS RECOVERY BP: NORMAL MMHG
SL CV STRESS RECOVERY HR: 81 BPM
SL CV STRESS RECOVERY O2 SAT: 100 %
STRESS ANGINA INDEX: 0
STRESS BASELINE BP: NORMAL MMHG
STRESS BASELINE HR: 71 BPM
STRESS O2 SAT REST: 98 %
STRESS PEAK HR: 100 BPM
STRESS POST EXERCISE DUR MIN: 3 MIN
STRESS POST EXERCISE DUR SEC: 0 SEC
STRESS POST O2 SAT PEAK: 100 %
STRESS POST PEAK BP: 138 MMHG
STRESS/REST PERFUSION RATIO: 1.1
TR MAX PG: 16 MMHG
TR PEAK VELOCITY: 2 M/S
TRICUSPID ANNULAR PLANE SYSTOLIC EXCURSION: 2.3 CM
TRICUSPID VALVE PEAK REGURGITATION VELOCITY: 2 M/S

## 2024-07-09 PROCEDURE — A9502 TC99M TETROFOSMIN: HCPCS

## 2024-07-09 PROCEDURE — 93017 CV STRESS TEST TRACING ONLY: CPT

## 2024-07-09 PROCEDURE — 78452 HT MUSCLE IMAGE SPECT MULT: CPT

## 2024-07-09 PROCEDURE — 93306 TTE W/DOPPLER COMPLETE: CPT

## 2024-07-09 RX ORDER — REGADENOSON 0.08 MG/ML
0.4 INJECTION, SOLUTION INTRAVENOUS ONCE
Status: COMPLETED | OUTPATIENT
Start: 2024-07-09 | End: 2024-07-09

## 2024-07-09 RX ORDER — ARIPIPRAZOLE 5 MG/1
5 TABLET ORAL DAILY
COMMUNITY
Start: 2024-06-13

## 2024-07-09 RX ORDER — IBUPROFEN 200 MG
1 CAPSULE ORAL DAILY
COMMUNITY

## 2024-07-09 RX ORDER — ARIPIPRAZOLE 20 MG/1
20 TABLET ORAL EVERY MORNING
COMMUNITY

## 2024-07-09 RX ORDER — RIBOFLAVIN (VITAMIN B2) 100 MG
400 TABLET ORAL DAILY
COMMUNITY
Start: 2024-04-16

## 2024-07-09 RX ORDER — CYPROHEPTADINE HYDROCHLORIDE 4 MG/1
TABLET ORAL
COMMUNITY

## 2024-07-09 RX ORDER — DULOXETIN HYDROCHLORIDE 30 MG/1
30 CAPSULE, DELAYED RELEASE ORAL 2 TIMES DAILY
COMMUNITY
Start: 2024-06-13

## 2024-07-09 RX ADMIN — REGADENOSON 0.4 MG: 0.08 INJECTION, SOLUTION INTRAVENOUS at 10:07

## 2024-07-11 LAB
CHEST PAIN STATEMENT: NORMAL
MAX DIASTOLIC BP: 90 MMHG
MAX PREDICTED HEART RATE: 173 BPM
PROTOCOL NAME: NORMAL
REASON FOR TERMINATION: NORMAL
STRESS POST EXERCISE DUR MIN: 3 MIN
STRESS POST EXERCISE DUR SEC: 0 SEC
STRESS POST PEAK HR: 100 BPM
STRESS POST PEAK SYSTOLIC BP: 138 MMHG
TARGET HR FORMULA: NORMAL
TEST INDICATION: NORMAL

## (undated) DEVICE — TROCAR: Brand: KII FIOS FIRST ENTRY

## (undated) DEVICE — PAD GROUNDING ADULT

## (undated) DEVICE — GLOVE INDICATOR PI UNDERGLOVE SZ 7.5 BLUE

## (undated) DEVICE — DRAPE TOWEL: Brand: CONVERTORS

## (undated) DEVICE — ASTOUND STANDARD SURGICAL GOWN, XL: Brand: CONVERTORS

## (undated) DEVICE — SUT MONOCRYL 4-0 PS-2 27 IN Y426H

## (undated) DEVICE — ADHESIVE SKIN HIGH VISCOSITY EXOFIN 1ML

## (undated) DEVICE — INTENDED FOR TISSUE SEPARATION, AND OTHER PROCEDURES THAT REQUIRE A SHARP SURGICAL BLADE TO PUNCTURE OR CUT.: Brand: BARD-PARKER SAFETY BLADES SIZE 11, STERILE

## (undated) DEVICE — TRAY FOLEY 16FR URIMETER SILICONE SURESTEP

## (undated) DEVICE — PENCIL ELECTROSURG E-Z CLEAN -0035H

## (undated) DEVICE — GLOVE SRG BIOGEL 7.5

## (undated) DEVICE — TUBING INSUFFLATION SET ISO CONNECTOR

## (undated) DEVICE — DRAPE EQUIPMENT RF WAND

## (undated) DEVICE — DRAPE LAPAROTOMY W/POUCHES

## (undated) DEVICE — VCARE DX UTERINE MANIPULATOR/INJECTOR CANNULA: Brand: VCARE DX

## (undated) DEVICE — SIGMOIDOSCOPIC SUCTION INSTRUMENT 18 FR W/WINGED CAP CONTROL AND 6 FOOT (1.8M) TUBING: Brand: SIGMOIDOSCOPIC

## (undated) DEVICE — VISUALIZATION SYSTEM: Brand: CLEARIFY

## (undated) DEVICE — CHLORHEXIDINE 4PCT 4 OZ

## (undated) DEVICE — CANNULA SEAL

## (undated) DEVICE — ELECTRO LUBE IS A SINGLE PATIENT USE DEVICE THAT IS INTENDED TO BE USED ON ELECTROSURGICAL ELECTRODES TO REDUCE STICKING.: Brand: KEY SURGICAL ELECTRO LUBE

## (undated) DEVICE — ENDOPATH PNEUMONEEDLE INSUFFLATION NEEDLES WITH LUER LOCK CONNECTORS 150MM: Brand: ENDOPATH

## (undated) DEVICE — SCD SEQUENTIAL COMPRESSION COMFORT SLEEVE MEDIUM KNEE LENGTH: Brand: KENDALL SCD

## (undated) DEVICE — ENDOPATH PNEUMONEEDLE INSUFFLATION NEEDLES WITH LUER LOCK CONNECTORS 120MM: Brand: ENDOPATH

## (undated) DEVICE — 40595 XL TRENDELENBURG POSITIONING KIT: Brand: 40595 XL TRENDELENBURG POSITIONING KIT

## (undated) DEVICE — TIP COVER ACCESSORY

## (undated) DEVICE — ARM DRAPE

## (undated) DEVICE — PLASTIC ADHESIVE BANDAGE: Brand: CURITY

## (undated) DEVICE — MAXI PAD5.51 X 13.78 IN. (14.0 X 35.0 CM)HEAVYCONTOUREDUNSCENTED: Brand: CURITY

## (undated) DEVICE — TOWEL SURG XR DETECT GREEN STRL RFD

## (undated) DEVICE — STERILE SURGICAL LUBRICANT,  TUBE: Brand: SURGILUBE

## (undated) DEVICE — FENESTRATED BIPOLAR FORCEPS: Brand: ENDOWRIST

## (undated) DEVICE — FLEXIBLE ADHESIVE BANDAGE,X-LARGE: Brand: CURITY

## (undated) DEVICE — PREMIUM DRY TRAY LF: Brand: MEDLINE INDUSTRIES, INC.

## (undated) DEVICE — TISSUE RETRIEVAL SYSTEM: Brand: INZII RETRIEVAL SYSTEM

## (undated) DEVICE — PACK PBDS STERILE LAP LITHOTOMY RF

## (undated) DEVICE — DECANTER: Brand: UNBRANDED

## (undated) DEVICE — NEEDLE 25G X 1 1/2

## (undated) DEVICE — COLUMN DRAPE

## (undated) DEVICE — VESSEL SEALER EXTEND: Brand: ENDOWRIST

## (undated) DEVICE — MAYO STAND COVER: Brand: CONVERTORS

## (undated) DEVICE — SYRINGE 10ML LL